# Patient Record
Sex: MALE | Race: WHITE | NOT HISPANIC OR LATINO | Employment: OTHER | URBAN - METROPOLITAN AREA
[De-identification: names, ages, dates, MRNs, and addresses within clinical notes are randomized per-mention and may not be internally consistent; named-entity substitution may affect disease eponyms.]

---

## 2022-07-18 ENCOUNTER — TELEPHONE (OUTPATIENT)
Dept: GASTROENTEROLOGY | Facility: CLINIC | Age: 52
End: 2022-07-18

## 2022-07-21 ENCOUNTER — TELEPHONE (OUTPATIENT)
Dept: GASTROENTEROLOGY | Facility: CLINIC | Age: 52
End: 2022-07-21

## 2022-07-21 NOTE — TELEPHONE ENCOUNTER
Patient LM on office VM requesting return call to schedule a [facility].  No other details provided.  I LM on patient VM to return my call after 8:00 am on Monday 7/25/22, as I will not be in the office on 7/22/22 to schedule his appt.

## 2023-01-10 ENCOUNTER — OFFICE VISIT (OUTPATIENT)
Dept: GASTROENTEROLOGY | Facility: CLINIC | Age: 53
End: 2023-01-10
Payer: OTHER GOVERNMENT

## 2023-01-10 VITALS
WEIGHT: 269 LBS | HEIGHT: 71 IN | SYSTOLIC BLOOD PRESSURE: 124 MMHG | BODY MASS INDEX: 37.66 KG/M2 | DIASTOLIC BLOOD PRESSURE: 78 MMHG

## 2023-01-10 DIAGNOSIS — R10.13 DYSPEPSIA: Primary | ICD-10-CM

## 2023-01-10 DIAGNOSIS — Z12.11 ENCOUNTER FOR SCREENING FOR MALIGNANT NEOPLASM OF COLON: ICD-10-CM

## 2023-01-10 PROCEDURE — 99204 OFFICE O/P NEW MOD 45 MIN: CPT | Performed by: INTERNAL MEDICINE

## 2023-01-10 RX ORDER — ALPRAZOLAM 0.5 MG/1
1 TABLET ORAL 2 TIMES DAILY
COMMUNITY
Start: 2022-12-22

## 2023-01-10 RX ORDER — OMEPRAZOLE 40 MG/1
1 CAPSULE, DELAYED RELEASE ORAL DAILY
COMMUNITY
Start: 2022-11-07 | End: 2023-01-10

## 2023-01-10 RX ORDER — DIPHENOXYLATE HYDROCHLORIDE AND ATROPINE SULFATE 2.5; .025 MG/1; MG/1
2 TABLET ORAL DAILY
COMMUNITY

## 2023-01-10 RX ORDER — OMEPRAZOLE 40 MG/1
40 CAPSULE, DELAYED RELEASE ORAL NIGHTLY
COMMUNITY

## 2023-01-10 RX ORDER — SUMATRIPTAN 100 MG/1
TABLET, FILM COATED ORAL
COMMUNITY
Start: 2022-10-04 | End: 2023-01-10

## 2023-01-10 RX ORDER — SUCRALFATE 1 G/1
1 TABLET ORAL NIGHTLY
COMMUNITY

## 2023-01-10 RX ORDER — CARVEDILOL 12.5 MG/1
TABLET ORAL
COMMUNITY
Start: 2022-07-25

## 2023-01-10 RX ORDER — PANTOPRAZOLE SODIUM 40 MG/1
40 TABLET, DELAYED RELEASE ORAL 2 TIMES DAILY
COMMUNITY

## 2023-01-10 RX ORDER — MELOXICAM 15 MG/1
1 TABLET ORAL DAILY
COMMUNITY
Start: 2022-11-07 | End: 2023-02-03 | Stop reason: HOSPADM

## 2023-01-10 RX ORDER — LOSARTAN POTASSIUM 50 MG/1
1 TABLET ORAL DAILY
COMMUNITY
Start: 2022-05-31 | End: 2023-06-01

## 2023-01-10 RX ORDER — CYCLOBENZAPRINE HCL 10 MG
1 TABLET ORAL 3 TIMES DAILY PRN
COMMUNITY
Start: 2023-01-09

## 2023-01-10 RX ORDER — ONDANSETRON 4 MG/1
4 TABLET, FILM COATED ORAL
COMMUNITY
Start: 2022-11-22

## 2023-01-10 RX ORDER — OLANZAPINE 2.5 MG/1
2.5 TABLET ORAL DAILY PRN
COMMUNITY
Start: 2022-08-02

## 2023-01-10 RX ORDER — PRAZOSIN HYDROCHLORIDE 2 MG/1
2 CAPSULE ORAL DAILY
COMMUNITY
Start: 2022-10-08

## 2023-01-10 RX ORDER — TADALAFIL 5 MG/1
1 TABLET ORAL DAILY
COMMUNITY
Start: 2022-10-09

## 2023-01-10 RX ORDER — ZOLPIDEM TARTRATE 10 MG/1
1 TABLET ORAL NIGHTLY PRN
COMMUNITY
Start: 2023-01-03

## 2023-01-10 NOTE — PROGRESS NOTES
PATIENT INFORMATION  Jorge Fung       - 1970    CHIEF COMPLAINT  Chief Complaint   Patient presents with   • Heartburn   • Abdominal Pain   • Vomiting       HISTORY OF PRESENT ILLNESS  Here from [facility]  For substernal burning and up to chest. Aggravated by PTSD which a couple of years ago had a \"breakdown\"     So his Nocturnal symptroms have increased to Daily symptoms and not necessarily worse with food but can bother him an hour to 90 min after a meal.     No vomiting nor dysphaiga      REVIEWED PERTINENT RESULTS/ LABS  No results found for: CASEREPORT, FINALDX  Lab Results   Component Value Date    HGB 16.1 2021    MCV 90.5 2021     2021    ALT 33 2020    AST 37 2020    HGBA1C 5.4 2020    TRIG 111 2020      No results found.    REVIEW OF SYSTEMS  Review of Systems   Constitutional: Negative.    Eyes: Negative.    Respiratory: Negative.    Cardiovascular: Negative.    Gastrointestinal: Positive for nausea and vomiting.        Reflux     Endocrine: Negative.    Genitourinary: Negative.    Musculoskeletal: Positive for arthralgias and back pain.   Skin: Negative.    Allergic/Immunologic: Positive for environmental allergies.   Neurological: Positive for headaches.   Hematological: Negative.    Psychiatric/Behavioral: Positive for agitation, sleep disturbance and suicidal ideas. The patient is nervous/anxious.          ACTIVE PROBLEMS  There are no problems to display for this patient.        PAST MEDICAL HISTORY  History reviewed. No pertinent past medical history.      SURGICAL HISTORY  History reviewed. No pertinent surgical history.      FAMILY HISTORY  History reviewed. No pertinent family history.      SOCIAL HISTORY  Social History     Occupational History   • Not on file   Tobacco Use   • Smoking status: Never   • Smokeless tobacco: Never   Vaping Use   • Vaping Use: Never used   Substance and Sexual Activity   • Alcohol use: Not Currently   •  Drug use: Never   • Sexual activity: Yes         CURRENT MEDICATIONS    Current Outpatient Medications:   •  ALPRAZolam (XANAX) 0.5 MG tablet, Take 1 tablet by mouth 2 (Two) Times a Day., Disp: , Rfl:   •  aspirin-sod bicarb-citric acid (DUNCAN-SELTZER) 325 MG effervescent tablet, Take 325 mg by mouth Every 6 (Six) Hours As Needed., Disp: , Rfl:   •  carvedilol (COREG) 12.5 MG tablet, , Disp: , Rfl:   •  cyclobenzaprine (FLEXERIL) 10 MG tablet, Take 1 tablet by mouth 3 (Three) Times a Day As Needed., Disp: , Rfl:   •  losartan (COZAAR) 50 MG tablet, Take 1 tablet by mouth Daily., Disp: , Rfl:   •  meloxicam (MOBIC) 15 MG tablet, Take 1 tablet by mouth Daily., Disp: , Rfl:   •  Mirabegron ER (Myrbetriq) 25 MG tablet sustained-release 24 hour 24 hr tablet, Take 25 mg by mouth., Disp: , Rfl:   •  multivitamin (THERAGRAN) tablet tablet, Take 2 tablets by mouth Daily., Disp: , Rfl:   •  OLANZapine (zyPREXA) 2.5 MG tablet, Take 2.5 mg by mouth Daily As Needed., Disp: , Rfl:   •  omeprazole (priLOSEC) 40 MG capsule, Take 40 mg by mouth Every Night., Disp: , Rfl:   •  ondansetron (ZOFRAN) 4 MG tablet, Take 4 mg by mouth., Disp: , Rfl:   •  pantoprazole (Protonix) 40 MG EC tablet, Take 40 mg by mouth 2 (Two) Times a Day., Disp: , Rfl:   •  prazosin (MINIPRESS) 2 MG capsule, Take 2 mg by mouth Daily., Disp: , Rfl:   •  sucralfate (CARAFATE) 1 g tablet, Take 1 g by mouth Every Night., Disp: , Rfl:   •  tadalafil (CIALIS) 5 MG tablet, Take 1 tablet by mouth Daily., Disp: , Rfl:   •  zolpidem (AMBIEN) 10 MG tablet, Take 1 tablet by mouth At Night As Needed., Disp: , Rfl:     ALLERGIES  Patient has no known allergies.    VITALS  Vitals:    01/10/23 1039   BP: 124/78   BP Location: Left arm   Patient Position: Sitting   Cuff Size: Adult   Weight: 122 kg (269 lb)   Height: 180.3 cm (71\")       PHYSICAL EXAM  Debilities/Disabilities Identified: None  Emotional Behavior: Appropriate  Wt Readings from Last 3 Encounters:   01/10/23 122  kg (269 lb)     Ht Readings from Last 1 Encounters:   01/10/23 180.3 cm (71\")     Body mass index is 37.52 kg/m².  Physical Exam  Constitutional:       Appearance: He is well-developed. He is not diaphoretic.   HENT:      Head: Normocephalic and atraumatic.   Eyes:      General: No scleral icterus.     Conjunctiva/sclera: Conjunctivae normal.      Pupils: Pupils are equal, round, and reactive to light.   Neck:      Thyroid: No thyromegaly.   Cardiovascular:      Rate and Rhythm: Normal rate and regular rhythm.      Heart sounds: Normal heart sounds. No murmur heard.    No gallop.   Pulmonary:      Effort: Pulmonary effort is normal.      Breath sounds: Normal breath sounds. No wheezing or rales.   Abdominal:      General: Bowel sounds are normal. There is no distension or abdominal bruit.      Palpations: Abdomen is soft. There is no shifting dullness, fluid wave or mass.      Tenderness: There is no abdominal tenderness. There is no guarding. Negative signs include Armstrong's sign.      Hernia: There is no hernia in the ventral area.   Musculoskeletal:         General: Normal range of motion.      Cervical back: Normal range of motion and neck supple.   Lymphadenopathy:      Cervical: No cervical adenopathy.   Skin:     General: Skin is warm and dry.      Findings: No erythema or rash.   Neurological:      Mental Status: He is alert and oriented to person, place, and time.   Psychiatric:         Mood and Affect: Mood normal.         Behavior: Behavior normal.         CLINICAL DATA REVIEWED   reviewed previous lab results and integrated with today's visit, reviewed notes from other physicians and/or last GI encounter, reviewed previous endoscopy results and available photos, reviewed surgical pathology results from previous biopsies    ASSESSMENT  Diagnoses and all orders for this visit:    Dyspepsia  -     Case Request; Standing  -     Case Request    Encounter for screening for malignant neoplasm of colon  -      Case Request; Standing  -     Case Request    Other orders  -     ALPRAZolam (XANAX) 0.5 MG tablet; Take 1 tablet by mouth 2 (Two) Times a Day.  -     carvedilol (COREG) 12.5 MG tablet  -     cyclobenzaprine (FLEXERIL) 10 MG tablet; Take 1 tablet by mouth 3 (Three) Times a Day As Needed.  -     losartan (COZAAR) 50 MG tablet; Take 1 tablet by mouth Daily.  -     meloxicam (MOBIC) 15 MG tablet; Take 1 tablet by mouth Daily.  -     multivitamin (THERAGRAN) tablet tablet; Take 2 tablets by mouth Daily.  -     OLANZapine (zyPREXA) 2.5 MG tablet; Take 2.5 mg by mouth Daily As Needed.  -     Discontinue: omeprazole (priLOSEC) 40 MG capsule; Take 1 capsule by mouth Daily.  -     ondansetron (ZOFRAN) 4 MG tablet; Take 4 mg by mouth.  -     prazosin (MINIPRESS) 2 MG capsule; Take 2 mg by mouth Daily.  -     Discontinue: SUMAtriptan (IMITREX) 100 MG tablet  -     tadalafil (CIALIS) 5 MG tablet; Take 1 tablet by mouth Daily.  -     zolpidem (AMBIEN) 10 MG tablet; Take 1 tablet by mouth At Night As Needed.  -     omeprazole (priLOSEC) 40 MG capsule; Take 40 mg by mouth Every Night.  -     pantoprazole (Protonix) 40 MG EC tablet; Take 40 mg by mouth 2 (Two) Times a Day.  -     sucralfate (CARAFATE) 1 g tablet; Take 1 g by mouth Every Night.  -     aspirin-sod bicarb-citric acid (DUNCAN-SELTZER) 325 MG effervescent tablet; Take 325 mg by mouth Every 6 (Six) Hours As Needed.  -     Mirabegron ER (Myrbetriq) 25 MG tablet sustained-release 24 hour 24 hr tablet; Take 25 mg by mouth.  -     Follow Anesthesia Guidelines / Protocol; Future  -     Obtain Informed Consent; Standing          PLAN    Return if symptoms worsen or fail to improve.    I have discussed the above plan with the patient.  They verbalize understanding and are in agreement with the plan.  They have been advised to contact the office for any questions, concerns, or changes related to their health.

## 2023-01-11 ENCOUNTER — TELEPHONE (OUTPATIENT)
Dept: GASTROENTEROLOGY | Facility: CLINIC | Age: 53
End: 2023-01-11
Payer: OTHER GOVERNMENT

## 2023-01-11 ENCOUNTER — PATIENT ROUNDING (BHMG ONLY) (OUTPATIENT)
Dept: GASTROENTEROLOGY | Facility: CLINIC | Age: 53
End: 2023-01-11
Payer: OTHER GOVERNMENT

## 2023-01-11 PROBLEM — Z12.11 ENCOUNTER FOR SCREENING FOR MALIGNANT NEOPLASM OF COLON: Status: ACTIVE | Noted: 2023-01-11

## 2023-01-11 PROBLEM — R10.13 DYSPEPSIA: Status: ACTIVE | Noted: 2023-01-11

## 2023-01-11 NOTE — PROGRESS NOTES
January 11, 2023    Hello, may I speak with Jorge Fung?    My name is Yelena Parker     I am  with [facility]  [address]  [facility] [address] [phone].    Before we get started may I verify your date of birth? 1970    I am calling to officially welcome you to our practice and ask about your recent visit. Is this a good time to talk? yes    Tell me about your visit with us. What things went well?  everything was great, staff was friendly, the doctor answered all my questions. Couldn't have been better.       We're always looking for ways to make our patients' experiences even better. Do you have recommendations on ways we may improve?  no    Overall were you satisfied with your first visit to our practice? yes       I appreciate you taking the time to speak with me today. Is there anything else I can do for you? no      Thank you, and have a great day.

## 2023-01-11 NOTE — TELEPHONE ENCOUNTER
----- Message from Guevara Rico MD sent at 1/10/2023 11:31 AM EST -----  EGD/Colon at [facility] either one  based on his schedule

## 2023-01-11 NOTE — TELEPHONE ENCOUNTER
Return call from patient.  Scheduled at [facility] 02/03/2023 at 11am - arrive 10am.  Will mail instructions.

## 2023-02-03 ENCOUNTER — ANESTHESIA (OUTPATIENT)
Dept: SURGERY | Facility: SURGERY CENTER | Age: 53
End: 2023-02-03
Payer: OTHER GOVERNMENT

## 2023-02-03 ENCOUNTER — ANESTHESIA EVENT (OUTPATIENT)
Dept: SURGERY | Facility: SURGERY CENTER | Age: 53
End: 2023-02-03
Payer: OTHER GOVERNMENT

## 2023-02-03 ENCOUNTER — HOSPITAL ENCOUNTER (OUTPATIENT)
Facility: SURGERY CENTER | Age: 53
Setting detail: HOSPITAL OUTPATIENT SURGERY
Discharge: HOME | End: 2023-02-03
Attending: INTERNAL MEDICINE | Admitting: INTERNAL MEDICINE
Payer: OTHER GOVERNMENT

## 2023-02-03 VITALS
WEIGHT: 255 LBS | HEART RATE: 89 BPM | DIASTOLIC BLOOD PRESSURE: 77 MMHG | SYSTOLIC BLOOD PRESSURE: 115 MMHG | OXYGEN SATURATION: 96 % | RESPIRATION RATE: 18 BRPM | BODY MASS INDEX: 35.7 KG/M2 | HEIGHT: 71 IN | TEMPERATURE: 98.4 F

## 2023-02-03 DIAGNOSIS — Z12.11 ENCOUNTER FOR SCREENING FOR MALIGNANT NEOPLASM OF COLON: ICD-10-CM

## 2023-02-03 DIAGNOSIS — R10.13 DYSPEPSIA: ICD-10-CM

## 2023-02-03 PROCEDURE — 88342 IMHCHEM/IMCYTCHM 1ST ANTB: CPT | Performed by: INTERNAL MEDICINE

## 2023-02-03 PROCEDURE — G0121 COLON CA SCRN NOT HI RSK IND: HCPCS | Performed by: INTERNAL MEDICINE

## 2023-02-03 PROCEDURE — 43239 EGD BIOPSY SINGLE/MULTIPLE: CPT | Performed by: INTERNAL MEDICINE

## 2023-02-03 PROCEDURE — 88305 TISSUE EXAM BY PATHOLOGIST: CPT | Performed by: INTERNAL MEDICINE

## 2023-02-03 PROCEDURE — 25010000002 PROPOFOL 10 MG/ML EMULSION: Performed by: ANESTHESIOLOGY

## 2023-02-03 PROCEDURE — 45378 DIAGNOSTIC COLONOSCOPY: CPT | Performed by: INTERNAL MEDICINE

## 2023-02-03 RX ORDER — SODIUM CHLORIDE, SODIUM LACTATE, POTASSIUM CHLORIDE, CALCIUM CHLORIDE 600; 310; 30; 20 MG/100ML; MG/100ML; MG/100ML; MG/100ML
20 INJECTION, SOLUTION INTRAVENOUS CONTINUOUS
Status: DISCONTINUED | OUTPATIENT
Start: 2023-02-03 | End: 2023-02-03 | Stop reason: HOSPADM

## 2023-02-03 RX ORDER — MAGNESIUM HYDROXIDE 1200 MG/15ML
LIQUID ORAL AS NEEDED
Status: DISCONTINUED | OUTPATIENT
Start: 2023-02-03 | End: 2023-02-03 | Stop reason: HOSPADM

## 2023-02-03 RX ORDER — LIDOCAINE HYDROCHLORIDE 20 MG/ML
INJECTION, SOLUTION INFILTRATION; PERINEURAL AS NEEDED
Status: DISCONTINUED | OUTPATIENT
Start: 2023-02-03 | End: 2023-02-03 | Stop reason: SURG

## 2023-02-03 RX ORDER — PROPOFOL 10 MG/ML
VIAL (ML) INTRAVENOUS AS NEEDED
Status: DISCONTINUED | OUTPATIENT
Start: 2023-02-03 | End: 2023-02-03 | Stop reason: SURG

## 2023-02-03 RX ADMIN — LIDOCAINE HYDROCHLORIDE 40 MG: 20 INJECTION, SOLUTION INFILTRATION; PERINEURAL at 11:23

## 2023-02-03 RX ADMIN — SODIUM CHLORIDE, SODIUM LACTATE, POTASSIUM CHLORIDE, AND CALCIUM CHLORIDE 20 ML/HR: .6; .31; .03; .02 INJECTION, SOLUTION INTRAVENOUS at 10:25

## 2023-02-03 RX ADMIN — GLYCOPYRROLATE 0.2 MG: 0.2 INJECTION, SOLUTION INTRAMUSCULAR; INTRAVITREAL at 11:23

## 2023-02-03 RX ADMIN — PROPOFOL 180 MCG/KG/MIN: 10 INJECTION, EMULSION INTRAVENOUS at 11:23

## 2023-02-03 RX ADMIN — PROPOFOL 200 MG: 10 INJECTION, EMULSION INTRAVENOUS at 11:23

## 2023-02-03 NOTE — BRIEF OP NOTE
ESOPHAGOGASTRODUODENOSCOPY, COLONOSCOPY  Progress Note    Jorge Fung  2/3/2023    Pre-op Diagnosis:   Dyspepsia [R10.13]  Encounter for screening for malignant neoplasm of colon [Z12.11]       Post-Op Diagnosis Codes:     * Dyspepsia [R10.13]     * Encounter for screening for malignant neoplasm of colon [Z12.11]     * Gastric ulcer [531]     * Reflux esophagitis [K21.00]    Procedure/CPT® Codes:        Procedure(s):  ESOPHAGOGASTRODUODENOSCOPY  COLONOSCOPY TO CECUM        Surgeon(s):  Guevara Rico MD    Anesthesia: Monitored Anesthesia Care    Staff:   Endo Technician: Rex Wolf, RN  Endo Nurse: Sandra Rand, NIURKA; Lori Bruce RN         Estimated Blood Loss: none    Urine Voided: * No values recorded between 2/3/2023 11:17 AM and 2/3/2023 11:43 AM *    Specimens:                Specimens     ID Source Type Tests Collected By Collected At Frozen?    A Gastric, Antrum Tissue · TISSUE PATHOLOGY EXAM   Guevara Rico MD 2/3/23 1128     Description: Gastric bx    Comment: Gastric bx    This specimen was not marked as sent.    B Esophagus, Distal Tissue · TISSUE PATHOLOGY EXAM   Guevara Rico MD 2/3/23 1128     Description: Distal esophagus bx    Comment: Distal esophagus bx    This specimen was not marked as sent.                Drains: * No LDAs found *    Findings: Normal Duodenum  Scattered Gastric Ulcers-Biopsy  Reflux Esophagitis-Biopsy    Colon to TI fair Prep  Normal Mucosa thru-out        Complications: None          Guevara Rico MD     Date: 2/3/2023  Time: 11:47 EST

## 2023-02-03 NOTE — ANESTHESIA PREPROCEDURE EVALUATION
Anesthesia Evaluation     Patient summary reviewed   no history of anesthetic complications:  NPO Solid Status: > 8 hours  NPO Liquid Status: > 2 hours           Airway   Mallampati: III  TM distance: >3 FB  Neck ROM: full  Possible difficult intubation and Large neck circumference  Dental - normal exam     Pulmonary - normal exam   (+) sleep apnea,   (-) COPD, asthma, not a smoker, lung cancer  Cardiovascular - normal exam  Exercise tolerance: good (4-7 METS)    Rhythm: regular  Rate: normal    (+) hypertension well controlled 2 medications or greater,   (-) valvular problems/murmurs, past MI, CAD, dysrhythmias, angina, CHF, cardiac stents, CABG, pericardial effusion      Neuro/Psych- negative ROS  (-) seizures, TIA, CVA  GI/Hepatic/Renal/Endo    (+) obesity,  GERD well controlled,      Musculoskeletal     Abdominal   (+) obese,     Abdomen: soft.   Substance History - negative use     OB/GYN          Other   arthritis,                      Anesthesia Plan    ASA 3     MAC     intravenous induction     Anesthetic plan, risks, benefits, and alternatives have been provided, discussed and informed consent has been obtained with: patient.        CODE STATUS:

## 2023-02-03 NOTE — ANESTHESIA POSTPROCEDURE EVALUATION
Patient: Jorge Fung    Procedure Summary     Date: 02/03/23 Room / Location: [facility] OR [facility] / [facility]    Anesthesia Start: 1117 Anesthesia Stop: 1149    Procedures:       ESOPHAGOGASTRODUODENOSCOPY (Esophagus)      COLONOSCOPY TO CECUM Diagnosis:       Dyspepsia      Encounter for screening for malignant neoplasm of colon      Gastric ulcer      Reflux esophagitis      (Dyspepsia [R10.13])      (Encounter for screening for malignant neoplasm of colon [Z12.11])    Surgeons: Guevara Rico MD Provider: Geronimo Regalado MD    Anesthesia Type: MAC ASA Status: 3          Anesthesia Type: MAC    Vitals  Vitals Value Taken Time   /76 02/03/23 1148   Temp 36.9 °C (98.4 °F) 02/03/23 1148   Pulse 101 02/03/23 1148   Resp 18 02/03/23 1148   SpO2 95 % 02/03/23 1148           Post Anesthesia Care and Evaluation    Patient location during evaluation: bedside  Patient participation: complete - patient participated  Level of consciousness: responsive to light touch, responsive to verbal stimuli and sleepy but conscious  Pain score: 0  Pain management: adequate    Airway patency: patent  Anesthetic complications: No anesthetic complications  PONV Status: none  Cardiovascular status: acceptable and hemodynamically stable  Respiratory status: acceptable  Hydration status: acceptable

## 2023-02-03 NOTE — INTERVAL H&P NOTE
"Vtial Signs  /83 (BP Location: Left arm, Patient Position: Lying)   Pulse 83   Temp 97.4 °F (36.3 °C) (Temporal)   Resp 16   Ht 180.3 cm (71\")   Wt 116 kg (255 lb)   SpO2 95%   BMI 35.57 kg/m²     H&P reviewed. The patient was examined and there are no changes to the H&P.        "

## 2023-02-07 LAB
LAB AP CASE REPORT: NORMAL
LAB AP CLINICAL INFORMATION: NORMAL
PATH REPORT.ADDENDUM SPEC: NORMAL
PATH REPORT.FINAL DX SPEC: NORMAL
PATH REPORT.GROSS SPEC: NORMAL

## 2023-04-13 ENCOUNTER — OFFICE VISIT (OUTPATIENT)
Dept: GASTROENTEROLOGY | Facility: CLINIC | Age: 53
End: 2023-04-13
Payer: OTHER GOVERNMENT

## 2023-04-13 VITALS
HEIGHT: 71 IN | BODY MASS INDEX: 35.14 KG/M2 | SYSTOLIC BLOOD PRESSURE: 128 MMHG | DIASTOLIC BLOOD PRESSURE: 84 MMHG | WEIGHT: 251 LBS

## 2023-04-13 DIAGNOSIS — K21.00 GASTROESOPHAGEAL REFLUX DISEASE WITH ESOPHAGITIS WITHOUT HEMORRHAGE: ICD-10-CM

## 2023-04-13 DIAGNOSIS — K25.9 GASTRIC ULCER WITHOUT HEMORRHAGE OR PERFORATION, UNSPECIFIED CHRONICITY: Primary | ICD-10-CM

## 2023-04-13 PROCEDURE — 99214 OFFICE O/P EST MOD 30 MIN: CPT

## 2023-04-13 RX ORDER — FAMOTIDINE 40 MG/1
40 TABLET, FILM COATED ORAL NIGHTLY
Qty: 90 TABLET | Refills: 3 | Status: SHIPPED | OUTPATIENT
Start: 2023-04-13

## 2023-04-13 RX ORDER — TOPIRAMATE 100 MG/1
150 TABLET, FILM COATED ORAL
COMMUNITY
Start: 2023-02-07

## 2023-04-13 RX ORDER — ALPRAZOLAM 0.5 MG/1
0.5 TABLET ORAL
COMMUNITY
Start: 2023-03-20 | End: 2023-04-13 | Stop reason: SDUPTHER

## 2023-04-13 RX ORDER — DIVALPROEX SODIUM 500 MG/1
500 TABLET, EXTENDED RELEASE ORAL DAILY
COMMUNITY
Start: 2023-02-07

## 2023-04-13 RX ORDER — OMEPRAZOLE 40 MG/1
1 CAPSULE, DELAYED RELEASE ORAL DAILY
COMMUNITY
Start: 2023-01-29 | End: 2023-04-13 | Stop reason: SDUPTHER

## 2023-04-13 NOTE — PROGRESS NOTES
PATIENT INFORMATION  Jorge Fung       - 1970    CHIEF COMPLAINT  Chief Complaint   Patient presents with   • Follow-up     EGD and Colonoscopy        HISTORY OF PRESENT ILLNESS  Here today for EGD and colon follow-up    2/3/2023 EGD and Colon for [facility] positive for erosive gastritis with visible ulcers, chronic reflux esophagitis, negative for HP, Barretts, avoid NSAIDS! Normal colon, so 10 yr recall. Has stopped mobic since, still taking ibuprofen.    At [facility] was having nocturnal and daily sx, often 1-1.5 hrs after meal. Substernal burning to chest. Only occurs with PTSD flare such as when awakening from sleep in night. 0-3 times a week, some worse than other. Several types of therapy concurrent for PTSD.    Now on nightly omeprazole, pantoprazole BID. Will stop omeprazole and add pepcid at night. Continue PRN QID.    Wegovy now, some constipation used miralax for relief, reviewed ok to take daily.      REVIEWED PERTINENT RESULTS/ LABS  Lab Results   Component Value Date    CASEREPORT  2023     Surgical Pathology Report                         Case: [id number]                                  Authorizing Provider:  Guevara Rico        Collected:           2023 11:28 AM                                 MD Monet                                                                   Ordering Location:     [facility]     Received:            2023 11:58 AM                                 [facility] [place] MAIN OR                                                     Pathologist:           Shannan Bailey MD                                                          Specimens:   1) - Gastric, Antrum, Gastric bx                                                                    2) - Esophagus, Distal, Distal esophagus bx                                                FINALDX  2023     1. Stomach, Antrum, Biopsy:    A. Gastric antral and oxyntic mucosa with chronic active  gastritis, lamina propria edema and focal surface                     erosion.   B. Negative for intestinal metaplasia.   C. H. pylori immunostain is pending with those results to follow as an addendum.    2. Esophagus, Distal, Biopsy:    A. Squamocolumnar mucosa with chronic inflammation and reactive epithelial changes.   B. Negative for intestinal metaplasia.    jab/jse        Lab Results   Component Value Date    HGB 16.1 05/13/2021    MCV 90.5 05/13/2021     05/13/2021    ALT 33 04/17/2020    AST 37 04/17/2020    HGBA1C 5.4 04/17/2020    TRIG 111 04/17/2020      No results found.    REVIEW OF SYSTEMS  Review of Systems   Constitutional: Positive for appetite change.   HENT: Negative.    Eyes: Negative.    Respiratory: Negative.    Cardiovascular: Negative.    Gastrointestinal: Positive for constipation.        GERD   Endocrine: Negative.    Genitourinary: Negative.    Musculoskeletal: Negative.    Skin: Negative.    Allergic/Immunologic: Negative.    Neurological: Negative.    Hematological: Negative.    Psychiatric/Behavioral: Positive for sleep disturbance.         ACTIVE PROBLEMS  Patient Active Problem List    Diagnosis    • Gastroesophageal reflux disease with esophagitis without hemorrhage [K21.00]    • Dyspepsia [R10.13]    • Encounter for screening for malignant neoplasm of colon [Z12.11]          PAST MEDICAL HISTORY  Past Medical History:   Diagnosis Date   • Arthritis    • GERD (gastroesophageal reflux disease)    • Hypertension    • Sleep apnea     doesn't wear CPAP         SURGICAL HISTORY  Past Surgical History:   Procedure Laterality Date   • BLEPHAROPLASTY  2017   • COLONOSCOPY N/A 2/3/2023    Procedure: COLONOSCOPY TO CECUM;  Surgeon: Guevara Rico MD;  Location[facility] OR;  Service: Gastroenterology;  Laterality: N/A;  normal   • CYST REMOVAL Left 2013    left knee   • ENDOSCOPY N/A 2/3/2023    Procedure: ESOPHAGOGASTRODUODENOSCOPY;  Surgeon: Guevara Rico  MD;  [place] [facility];  Service: Gastroenterology;  Laterality: N/A;  Espophagitis, gastric ulcer   • FACIAL PROCEDURE  1988   • RHINOPLASTY  1992   • TONSILLECTOMY           FAMILY HISTORY  History reviewed. No pertinent family history.      SOCIAL HISTORY  Social History     Occupational History   • Not on file   Tobacco Use   • Smoking status: Never   • Smokeless tobacco: Never   Vaping Use   • Vaping Use: Never used   Substance and Sexual Activity   • Alcohol use: Not Currently   • Drug use: Never   • Sexual activity: Yes         CURRENT MEDICATIONS    Current Outpatient Medications:   •  ALPRAZolam (XANAX) 0.5 MG tablet, Take 1 tablet by mouth 2 (Two) Times a Day., Disp: , Rfl:   •  aspirin-sod bicarb-citric acid (DUNCAN-SELTZER) 325 MG effervescent tablet, Take 325 mg by mouth Every 6 (Six) Hours As Needed., Disp: , Rfl:   •  carvedilol (COREG) 12.5 MG tablet, , Disp: , Rfl:   •  cyclobenzaprine (FLEXERIL) 10 MG tablet, Take 1 tablet by mouth 3 (Three) Times a Day As Needed., Disp: , Rfl:   •  divalproex (DEPAKOTE ER) 500 MG 24 hr tablet, Take 1 tablet by mouth Daily., Disp: , Rfl:   •  losartan (COZAAR) 50 MG tablet, Take 1 tablet by mouth Daily., Disp: , Rfl:   •  Mirabegron ER (MYRBETRIQ) 25 MG tablet sustained-release 24 hour 24 hr tablet, Take 1 tablet by mouth., Disp: , Rfl:   •  multivitamin (THERAGRAN) tablet tablet, Take 2 tablets by mouth Daily., Disp: , Rfl:   •  OLANZapine (zyPREXA) 2.5 MG tablet, Take 1 tablet by mouth Daily As Needed., Disp: , Rfl:   •  ondansetron (ZOFRAN) 4 MG tablet, Take 1 tablet by mouth., Disp: , Rfl:   •  pantoprazole (PROTONIX) 40 MG EC tablet, Take 1 tablet by mouth 2 (Two) Times a Day., Disp: , Rfl:   •  prazosin (MINIPRESS) 2 MG capsule, Take 1 capsule by mouth Daily., Disp: , Rfl:   •  sucralfate (CARAFATE) 1 g tablet, Take 1 tablet by mouth Every Night., Disp: , Rfl:   •  tadalafil (CIALIS) 5 MG tablet, Take 1 tablet by mouth Daily., Disp: , Rfl:   •  topiramate  "(TOPAMAX) 100 MG tablet, Take 1.5 tablets by mouth., Disp: , Rfl:   •  zolpidem (AMBIEN) 10 MG tablet, Take 1 tablet by mouth At Night As Needed., Disp: , Rfl:   •  famotidine (PEPCID) 40 MG tablet, Take 1 tablet by mouth Every Night., Disp: 90 tablet, Rfl: 3    ALLERGIES  Patient has no known allergies.    VITALS  Vitals:    04/13/23 0908   BP: 128/84   BP Location: Left arm   Patient Position: Sitting   Cuff Size: Adult   Weight: 114 kg (251 lb)   Height: 180.3 cm (70.98\")       PHYSICAL EXAM  Debilities/Disabilities Identified: None  Emotional Behavior: Appropriate  Wt Readings from Last 3 Encounters:   04/13/23 114 kg (251 lb)   02/03/23 116 kg (255 lb)   01/10/23 122 kg (269 lb)     Ht Readings from Last 1 Encounters:   04/13/23 180.3 cm (70.98\")     Body mass index is 35.02 kg/m².  Physical Exam  Constitutional:       General: He is not in acute distress.     Appearance: Normal appearance. He is not ill-appearing.   HENT:      Head: Normocephalic and atraumatic.      Mouth/Throat:      Mouth: Mucous membranes are moist.      Pharynx: No posterior oropharyngeal erythema.   Eyes:      General: No scleral icterus.  Cardiovascular:      Rate and Rhythm: Normal rate and regular rhythm.      Heart sounds: Normal heart sounds.   Pulmonary:      Effort: Pulmonary effort is normal.      Breath sounds: Normal breath sounds.   Abdominal:      General: Abdomen is flat. Bowel sounds are normal. There is no distension.      Palpations: Abdomen is soft. There is no mass.      Tenderness: There is no abdominal tenderness. There is no guarding or rebound. Negative signs include Armstrong's sign.      Hernia: No hernia is present.   Musculoskeletal:      Cervical back: Neck supple.   Skin:     General: Skin is warm.      Capillary Refill: Capillary refill takes less than 2 seconds.   Neurological:      General: No focal deficit present.      Mental Status: He is alert and oriented to person, place, and time.   Psychiatric:         " Mood and Affect: Mood normal.         Behavior: Behavior normal.         Thought Content: Thought content normal.         Judgment: Judgment normal.         CLINICAL DATA REVIEWED   reviewed previous lab results and integrated with today's visit, reviewed notes from other physicians and/or last GI encounter, reviewed previous endoscopy results and available photos, reviewed surgical pathology results from previous biopsies    ASSESSMENT  Diagnoses and all orders for this visit:    Gastric ulcer without hemorrhage or perforation, unspecified chronicity  -     famotidine (PEPCID) 40 MG tablet; Take 1 tablet by mouth Every Night.    Gastroesophageal reflux disease with esophagitis without hemorrhage    Other orders  -     Discontinue: ALPRAZolam (XANAX) 0.5 MG tablet; Take 1 tablet by mouth.  -     divalproex (DEPAKOTE ER) 500 MG 24 hr tablet; Take 1 tablet by mouth Daily.  -     Discontinue: omeprazole (priLOSEC) 40 MG capsule; Take 1 capsule by mouth Daily.  -     topiramate (TOPAMAX) 100 MG tablet; Take 1.5 tablets by mouth.          PLAN    Continue BID PPI, add pepcid at night  Miralax PRN    Return in about 1 year (around 4/13/2024).    I have discussed the above plan with the patient.  They verbalize understanding and are in agreement with the plan.  They have been advised to contact the office for any questions, concerns, or changes related to their health.

## 2023-05-02 ENCOUNTER — TELEPHONE (OUTPATIENT)
Dept: GASTROENTEROLOGY | Facility: CLINIC | Age: 53
End: 2023-05-02
Payer: OTHER GOVERNMENT

## 2023-05-02 NOTE — TELEPHONE ENCOUNTER
PT RETURNED YOUR CALL AND SAID THAT IF YOU WOULD CALL HIM TOMORROW AND TELL HIM WHERE TO COME  HIS LETTER , HE WOULD APPRECIATE IT

## 2023-05-02 NOTE — TELEPHONE ENCOUNTER
LM on patient's VM that I am typing his requested letter approving [facility] program per Dr. Rico.  Advised I will be contacting the pt tomorrow when this is completed and signed.

## 2023-05-04 ENCOUNTER — TELEPHONE (OUTPATIENT)
Dept: GASTROENTEROLOGY | Facility: CLINIC | Age: 53
End: 2023-05-04
Payer: OTHER GOVERNMENT

## 2023-09-28 ENCOUNTER — TELEPHONE (OUTPATIENT)
Dept: GASTROENTEROLOGY | Facility: CLINIC | Age: 53
End: 2023-09-28
Payer: OTHER GOVERNMENT

## 2023-09-28 NOTE — TELEPHONE ENCOUNTER
Patient called for copy of his EGD & colonoscopy from 02/2023.  Will be here to  around 2pm.  Will be at the .

## 2023-11-13 ENCOUNTER — TELEPHONE (OUTPATIENT)
Dept: GASTROENTEROLOGY | Facility: CLINIC | Age: 53
End: 2023-11-13
Payer: OTHER GOVERNMENT

## 2023-11-13 NOTE — TELEPHONE ENCOUNTER
Caller: Jorge Fung    Relationship: Self    Best call back number: [phone]     What form or medical record are you requesting: PHOTOS AND LAB REPORT    Who is requesting this form or medical record from you: SELF    How would you like to receive the form or medical records (pick-up, mail, fax): PICKUP AT OFFICE    If pick-up, provide patient with address and location details    Timeframe paperwork needed: ASAP    Additional notes: PT CALLED AND STATED HE [facility] AND WAS WONDERING IF HE COULD HAVE SOME COLOR PHOTOS OF HIS EGD PRINTED THAT WAS DONE IN FEB. PT WOULD ALSO LIKE A COPY OF THE LAB REPORT. PLEASE CALL BACK WHEN READY. PT WILL COME BY OFFICE TO .  CALL BACK ANYTIME OK TO [facility].

## 2023-11-16 NOTE — TELEPHONE ENCOUNTER
Patient came in yesterday to pick report.  He states needs to in color, no black and white.  Will try to get color Op report.    Fax release of Medical records for color copy.

## 2024-04-15 ENCOUNTER — OFFICE VISIT (OUTPATIENT)
Dept: GASTROENTEROLOGY | Facility: CLINIC | Age: 54
End: 2024-04-15
Payer: OTHER GOVERNMENT

## 2024-04-15 VITALS
SYSTOLIC BLOOD PRESSURE: 110 MMHG | HEIGHT: 71 IN | BODY MASS INDEX: 31.25 KG/M2 | DIASTOLIC BLOOD PRESSURE: 80 MMHG | WEIGHT: 223.2 LBS

## 2024-04-15 DIAGNOSIS — K59.09 CHRONIC CONSTIPATION: ICD-10-CM

## 2024-04-15 DIAGNOSIS — K21.00 GASTROESOPHAGEAL REFLUX DISEASE WITH ESOPHAGITIS WITHOUT HEMORRHAGE: Primary | ICD-10-CM

## 2024-04-15 PROCEDURE — 99214 OFFICE O/P EST MOD 30 MIN: CPT

## 2024-04-15 RX ORDER — OMEPRAZOLE 40 MG/1
40 CAPSULE, DELAYED RELEASE ORAL
Qty: 180 CAPSULE | Refills: 3 | Status: SHIPPED | OUTPATIENT
Start: 2024-04-15

## 2024-04-15 RX ORDER — ALPRAZOLAM 0.25 MG/1
0.25 TABLET ORAL 2 TIMES DAILY
COMMUNITY
Start: 2024-03-22

## 2024-04-15 NOTE — PROGRESS NOTES
PATIENT INFORMATION  Jorge Fung       - 1970    CHIEF COMPLAINT  Chief Complaint   Patient presents with    Heartburn       HISTORY OF PRESENT ILLNESS    Here today for GERD follow-up    GERD flare last night 1:30-2 AM with acid regurg, HB abdominal pain, epigastric distress, ran to bathroom, did not vomit. Took 2 sucralfate, sat  up watching TV, continued regurg, ice cream to coat esophagus, lasted all night. 2 more sucralfate this morning and feeling better other than tired. Pantoprazole BID AC, famotidine before bedtime. Within last several months, breakthrough a few times a week. Eating dinner 2.5-3 hrs before bed. Eating 1-2 meals a day.     At LOV was having nocturnal and daily sx, often 1-1.5 hrs after meal. Substernal burning to chest. Only occurs with PTSD flare such as when awakening from sleep in night. 0-3 times a week, some worse than other. Several types of therapy concurrent for PTSD.     Wegovy now, some constipation used miralax for relief, reviewed ok to take daily. Still on wegovy 2.4 which may contribute to symptoms. Bowels a little constipated, hard like gravel, moving daily. Taking senna daily, will stop and start miralax daily and call if no improvement.    2/3/2023 EGD and Colon for NCCP positive for erosive gastritis with visible ulcers, chronic reflux esophagitis, negative for HP, Barretts, avoid NSAIDS! Normal colon, so 10 yr recall. Has stopped mobic since, still taking ibuprofen.      Heartburn  He complains of abdominal pain, coughing and nausea.       REVIEWED PERTINENT RESULTS/ LABS  Lab Results   Component Value Date    CASEREPORT  2023     Surgical Pathology Report                         Case: [id number]                                  Authorizing Provider:  Guevara Rico        Collected:           2023 11:28 AM                                 MD Monet                                                                   Ordering Location:      [facility] SURGERY     Received:            02/03/2023 11:58 AM                                 [facility] MAIN OR                                                     Pathologist:           Shannan Bailey MD                                                          Specimens:   1) - Gastric, Antrum, Gastric bx                                                                    2) - Esophagus, Distal, Distal esophagus bx                                                FINALDX  02/03/2023     1. Stomach, Antrum, Biopsy:    A. Gastric antral and oxyntic mucosa with chronic active gastritis, lamina propria edema and focal surface                     erosion.   B. Negative for intestinal metaplasia.   C. H. pylori immunostain is pending with those results to follow as an addendum.    2. Esophagus, Distal, Biopsy:    A. Squamocolumnar mucosa with chronic inflammation and reactive epithelial changes.   B. Negative for intestinal metaplasia.    jab/jse        Lab Results   Component Value Date    HGB 16.1 05/13/2021    MCV 90.5 05/13/2021     05/13/2021    ALT 40 05/13/2021    AST 44 (H) 05/13/2021    HGBA1C 5.4 04/17/2020    TRIG 111 04/17/2020      No results found.    REVIEW OF SYSTEMS  Review of Systems   Constitutional: Negative.    HENT:  Positive for trouble swallowing.    Eyes: Negative.    Respiratory:  Positive for cough.    Cardiovascular: Negative.    Gastrointestinal:  Positive for abdominal pain, constipation and nausea.        GERD   Endocrine: Negative.    Genitourinary: Negative.    Musculoskeletal: Negative.    Skin: Negative.    Allergic/Immunologic: Negative.    Neurological: Negative.    Hematological: Negative.    Psychiatric/Behavioral: Negative.           ACTIVE PROBLEMS  Patient Active Problem List    Diagnosis     Chronic constipation [K59.09]     Gastroesophageal reflux disease with esophagitis without hemorrhage [K21.00]     Dyspepsia [R10.13]     Encounter for screening for  malignant neoplasm of colon [Z12.11]          PAST MEDICAL HISTORY  Past Medical History:   Diagnosis Date    Arthritis     GERD (gastroesophageal reflux disease)     Hernia 1970    Born wirh inguinal hernia    Hypertension     Sleep apnea     doesn't wear CPAP         SURGICAL HISTORY  Past Surgical History:   Procedure Laterality Date    BLEPHAROPLASTY  2017    COLONOSCOPY N/A 02/03/2023    Procedure: COLONOSCOPY TO CECUM;  Surgeon: Guevara Rico MD;  [place] [facility] [place]  Service: Gastroenterology;  Laterality: N/A;  normal    CYST REMOVAL Left 2013    left knee    ENDOSCOPY N/A 02/03/2023    Procedure: ESOPHAGOGASTRODUODENOSCOPY;  Surgeon: Guevara Rico MD;  [place] [facility] [place];  Service: Gastroenterology;  Laterality: N/A;  Espophagitis, gastric ulcer    FACIAL PROCEDURE  1988    HERNIA REPAIR  May 1970    RHINOPLASTY  1992    TONSILLECTOMY           FAMILY HISTORY  History reviewed. No pertinent family history.      SOCIAL HISTORY  Social History     Occupational History    Not on file   Tobacco Use    Smoking status: Never    Smokeless tobacco: Never   Vaping Use    Vaping status: Never Used   Substance and Sexual Activity    Alcohol use: Not Currently    Drug use: Never    Sexual activity: Yes         CURRENT MEDICATIONS    Current Outpatient Medications:     ALPRAZolam (XANAX) 0.25 MG tablet, Take 1 tablet by mouth 2 (Two) Times a Day., Disp: , Rfl:     aspirin-sod bicarb-citric acid (DUNCAN-SELTZER) 325 MG effervescent tablet, Take 325 mg by mouth Every 6 (Six) Hours As Needed., Disp: , Rfl:     carvedilol (COREG) 12.5 MG tablet, , Disp: , Rfl:     cyclobenzaprine (FLEXERIL) 10 MG tablet, Take 1 tablet by mouth 3 (Three) Times a Day As Needed., Disp: , Rfl:     divalproex (DEPAKOTE ER) 500 MG 24 hr tablet, Take 1 tablet by mouth Daily., Disp: , Rfl:     famotidine (PEPCID) 40 MG tablet, Take 1 tablet by mouth Every Night., Disp: 90 tablet, Rfl: 3    Mirabegron ER  "(MYRBETRIQ) 25 MG tablet sustained-release 24 hour 24 hr tablet, Take 1 tablet by mouth., Disp: , Rfl:     multivitamin (THERAGRAN) tablet tablet, Take 2 tablets by mouth Daily., Disp: , Rfl:     OLANZapine (zyPREXA) 2.5 MG tablet, Take 1 tablet by mouth Daily As Needed., Disp: , Rfl:     ondansetron (ZOFRAN) 4 MG tablet, Take 1 tablet by mouth., Disp: , Rfl:     pantoprazole (PROTONIX) 40 MG EC tablet, Take 1 tablet by mouth 2 (Two) Times a Day., Disp: , Rfl:     prazosin (MINIPRESS) 2 MG capsule, Take 1 capsule by mouth Daily., Disp: , Rfl:     sucralfate (CARAFATE) 1 g tablet, Take 1 tablet by mouth Every Night., Disp: , Rfl:     tadalafil (CIALIS) 5 MG tablet, Take 1 tablet by mouth Daily., Disp: , Rfl:     topiramate (TOPAMAX) 100 MG tablet, Take 1.5 tablets by mouth., Disp: , Rfl:     zolpidem (AMBIEN) 10 MG tablet, Take 1 tablet by mouth At Night As Needed., Disp: , Rfl:     losartan (COZAAR) 50 MG tablet, Take 1 tablet by mouth Daily., Disp: , Rfl:     ALLERGIES  Patient has no known allergies.    VITALS  Vitals:    04/15/24 1003   BP: 110/80   BP Location: Left arm   Patient Position: Sitting   Cuff Size: Large Adult   Weight: 101 kg (223 lb 3.2 oz)   Height: 180.3 cm (70.98\")       PHYSICAL EXAM  Debilities/Disabilities Identified: None  Emotional Behavior: Appropriate  Wt Readings from Last 3 Encounters:   04/15/24 101 kg (223 lb 3.2 oz)   04/13/23 114 kg (251 lb)   02/03/23 116 kg (255 lb)     Ht Readings from Last 1 Encounters:   04/15/24 180.3 cm (70.98\")     Body mass index is 31.14 kg/m².  Physical Exam  Constitutional:       General: He is not in acute distress.     Appearance: Normal appearance. He is not ill-appearing.   HENT:      Head: Normocephalic and atraumatic.      Mouth/Throat:      Mouth: Mucous membranes are moist.      Pharynx: No posterior oropharyngeal erythema.   Eyes:      General: No scleral icterus.  Cardiovascular:      Rate and Rhythm: Normal rate and regular rhythm.      Heart " sounds: Normal heart sounds.   Pulmonary:      Effort: Pulmonary effort is normal.      Breath sounds: Normal breath sounds.   Abdominal:      General: Abdomen is flat. Bowel sounds are normal. There is no distension.      Palpations: Abdomen is soft. There is no mass.      Tenderness: There is no abdominal tenderness. There is no guarding or rebound. Negative signs include Armstrong's sign.      Hernia: No hernia is present.   Musculoskeletal:      Cervical back: Neck supple.   Skin:     General: Skin is warm.      Capillary Refill: Capillary refill takes less than 2 seconds.   Neurological:      General: No focal deficit present.      Mental Status: He is alert and oriented to person, place, and time.   Psychiatric:         Mood and Affect: Mood normal.         Behavior: Behavior normal.         Thought Content: Thought content normal.         Judgment: Judgment normal.         CLINICAL DATA REVIEWED   reviewed previous lab results and integrated with today's visit, reviewed notes from other physicians and/or last GI encounter, reviewed previous endoscopy results and available photos, reviewed surgical pathology results from previous biopsies    ASSESSMENT  Diagnoses and all orders for this visit:    Gastroesophageal reflux disease with esophagitis without hemorrhage    Chronic constipation    Other orders  -     ALPRAZolam (XANAX) 0.25 MG tablet; Take 1 tablet by mouth 2 (Two) Times a Day.          PLAN    Reviewed GP diet  Will switch to omeprazole  Continue famotidine at night  Stop senna, add daily miralax, call if not enough improvement  Counseled to review pain options with patient and recommend avoiding NSAIDS    Return in about 6 months (around 10/15/2024).    I have discussed the above plan with the patient.  They verbalize understanding and are in agreement with the plan.  They have been advised to contact the office for any questions, concerns, or changes related to their health.

## 2024-08-02 ENCOUNTER — TELEPHONE (OUTPATIENT)
Dept: GASTROENTEROLOGY | Facility: CLINIC | Age: 54
End: 2024-08-02

## 2024-08-02 NOTE — TELEPHONE ENCOUNTER
Advised pt that letter would be ready for  on Monday afternoon.   I will call him as soon as I have Dr. Rico's signature. Pt voiced understanding and is satisfied with this plan.

## 2024-08-02 NOTE — TELEPHONE ENCOUNTER
Caller: Jorge Fung    Relationship to patient: Self    Best call back number:     Patient is needing: PATIENT STATES THAT HE HAS BEEN SPEAKING WITH SARAH IN REGARD TO [facility] THAT [facility] DR[facility]PED OFF. HE STATES THAT IT NEEDED TO BE EDITED AND THEN SIGNED BY DR. CALLOWAY. PLEASE CALL BACK TO UPDATE.

## 2024-09-10 ENCOUNTER — OFFICE VISIT (OUTPATIENT)
Dept: GASTROENTEROLOGY | Facility: CLINIC | Age: 54
End: 2024-09-10
Payer: OTHER GOVERNMENT

## 2024-09-10 VITALS
WEIGHT: 228 LBS | SYSTOLIC BLOOD PRESSURE: 118 MMHG | HEIGHT: 71 IN | DIASTOLIC BLOOD PRESSURE: 80 MMHG | BODY MASS INDEX: 31.92 KG/M2

## 2024-09-10 DIAGNOSIS — K21.00 GASTROESOPHAGEAL REFLUX DISEASE WITH ESOPHAGITIS WITHOUT HEMORRHAGE: Primary | ICD-10-CM

## 2024-09-10 DIAGNOSIS — K59.04 CHRONIC IDIOPATHIC CONSTIPATION: ICD-10-CM

## 2024-09-10 RX ORDER — IBUPROFEN 800 MG/1
800 TABLET, FILM COATED ORAL 3 TIMES DAILY
COMMUNITY
Start: 2024-04-22 | End: 2024-12-13

## 2024-09-10 RX ORDER — FUROSEMIDE 20 MG
20 TABLET ORAL DAILY
COMMUNITY
Start: 2024-08-22 | End: 2024-09-21

## 2024-09-10 RX ORDER — VONOPRAZAN FUMARATE 26.72 MG/1
20 TABLET ORAL DAILY
Qty: 30 TABLET | Refills: 1 | COMMUNITY
Start: 2024-09-10 | End: 2024-09-10 | Stop reason: SDUPTHER

## 2024-09-10 RX ORDER — VONOPRAZAN FUMARATE 26.72 MG/1
20 TABLET ORAL DAILY
Qty: 30 TABLET | Refills: 1 | Status: SHIPPED | OUTPATIENT
Start: 2024-09-10

## 2024-09-10 RX ORDER — RIMEGEPANT SULFATE 75 MG/75MG
TABLET, ORALLY DISINTEGRATING ORAL EVERY OTHER DAY
COMMUNITY
Start: 2024-06-01 | End: 2024-12-08

## 2024-09-10 RX ORDER — POLYETHYLENE GLYCOL 3350 17 G/17G
17 POWDER, FOR SOLUTION ORAL AS NEEDED
COMMUNITY
Start: 2024-06-11

## 2024-09-10 RX ORDER — SEMAGLUTIDE 2.4 MG/.75ML
INJECTION, SOLUTION SUBCUTANEOUS WEEKLY
COMMUNITY

## 2024-09-10 NOTE — PROGRESS NOTES
PATIENT INFORMATION  Jorge Fung       - 1970    CHIEF COMPLAINT  Chief Complaint   Patient presents with    Heartburn    Chronic idiopathic constipation       HISTORY OF PRESENT ILLNESS    Here today for GERD follow-up     GERD Per LOV: flare last night 1:30-2 AM with acid regurg, HB abdominal pain, epigastric distress, ran to bathroom, did not vomit. Took 2 sucralfate, sat  up watching TV, continued regurg, ice cream to coat esophagus, lasted all night. 2 more sucralfate this morning and feeling better other than tired. Pantoprazole BID AC, famotidine before bedtime. Within last several months, breakthrough a few times a week. Eating dinner 2.5-3 hrs before bed. Eating 1-2 meals a day. We switched to omeprazole BID AC and continued famotidine at night. Encouraged to avoid NSAIDs.    Has FAILED: Omeprazole, famotidine, pantoprazole, sucralfate and is still having persistent and severe symptoms even with a combination.    TODAY: Ended up on omeprazole and pantoprazole at same time and when ran out of pantoprazole started feeling bad again. HB at least once a week or every week and a half. Not taking sucralfate at night, sucralfate gives some relief as needed.      At LOV: Wegovy now, some constipation used miralax for relief, reviewed ok to take daily. Still on wegovy 2.4 which may contribute to symptoms. Bowels a little constipated, hard like gravel, moving daily. Taking senna daily, will stop and start miralax daily and call if no improvement. Reviewed GP diet. TODAY: Doing well on wegovy. Bowels are moving decent, miralax every other day, bowels moving daily but not often complete. Encouraged to take miralax daily.      2/3/2023 EGD and Colon for NCCP positive for erosive gastritis with visible ulcers, chronic reflux esophagitis, negative for HP, Barretts, avoid NSAIDS! Normal colon, so 10 yr recall. Has stopped mobic since, still taking ibuprofen.          REVIEWED PERTINENT RESULTS/ LABS  Lab  Results   Component Value Date    CASEREPORT  02/03/2023     Surgical Pathology Report                         Case: [id number]                                  Authorizing Provider:  Guevara Rico        Collected:           02/03/2023 11:28 AM                                 MD Monet                                                                   Ordering Location:     [facility] SURGERY     Received:            02/03/2023 11:58 AM                                 [facility] MAIN OR                                                     Pathologist:           Shannan Bailey MD                                                          Specimens:   1) - Gastric, Antrum, Gastric bx                                                                    2) - Esophagus, Distal, Distal esophagus bx                                                FINALDX  02/03/2023     1. Stomach, Antrum, Biopsy:    A. Gastric antral and oxyntic mucosa with chronic active gastritis, lamina propria edema and focal surface                     erosion.   B. Negative for intestinal metaplasia.   C. H. pylori immunostain is pending with those results to follow as an addendum.    2. Esophagus, Distal, Biopsy:    A. Squamocolumnar mucosa with chronic inflammation and reactive epithelial changes.   B. Negative for intestinal metaplasia.    jab/jse        Lab Results   Component Value Date    HGB 16.1 05/13/2021    MCV 90.5 05/13/2021     05/13/2021    ALT 40 05/13/2021    AST 44 (H) 05/13/2021    HGBA1C 5.4 04/17/2020    TRIG 111 04/17/2020      No results found.    REVIEW OF SYSTEMS  Review of Systems   Constitutional:  Negative for activity change, chills, fever and unexpected weight change.   HENT:  Positive for trouble swallowing. Negative for congestion.    Eyes:  Negative for visual disturbance.   Respiratory:  Negative for shortness of breath.    Cardiovascular:  Negative for chest pain and palpitations.    Gastrointestinal:  Positive for abdominal distention, abdominal pain, constipation and nausea. Negative for blood in stool.        Reflux   Endocrine: Negative for cold intolerance and heat intolerance.   Genitourinary:  Negative for hematuria.   Musculoskeletal:  Negative for gait problem.   Skin:  Negative for color change.   Allergic/Immunologic: Negative for immunocompromised state.   Neurological:  Negative for weakness and light-headedness.   Hematological:  Negative for adenopathy.   Psychiatric/Behavioral:  Negative for sleep disturbance. The patient is not nervous/anxious.          ACTIVE PROBLEMS  Patient Active Problem List    Diagnosis     Chronic constipation [K59.09]     Gastroesophageal reflux disease with esophagitis without hemorrhage [K21.00]     Dyspepsia [R10.13]     Encounter for screening for malignant neoplasm of colon [Z12.11]          PAST MEDICAL HISTORY  Past Medical History:   Diagnosis Date    Arthritis     GERD (gastroesophageal reflux disease)     Hernia 1970    Born wirh inguinal hernia    Hypertension     Sleep apnea     doesn't wear CPAP         SURGICAL HISTORY  Past Surgical History:   Procedure Laterality Date    BLEPHAROPLASTY  2017    COLONOSCOPY N/A 02/03/2023    Procedure: COLONOSCOPY TO CECUM;  Surgeon: Guevara Rico MD;  [place]: [facility] [place];  Service: Gastroenterology;  Laterality: N/A;  normal    CYST REMOVAL Left 2013    left knee    ENDOSCOPY N/A 02/03/2023    Procedure: ESOPHAGOGASTRODUODENOSCOPY;  Surgeon: Guevara Rico MD;  Location: [facility] [place] [facility] OR;  Service: Gastroenterology;  Laterality: N/A;  Espophagitis, gastric ulcer    FACIAL PROCEDURE  1988    HERNIA REPAIR  May 1970    RHINOPLASTY  1992    TONSILLECTOMY           FAMILY HISTORY  History reviewed. No pertinent family history.      SOCIAL HISTORY  Social History     Occupational History    Not on file   Tobacco Use    Smoking status: Never    Smokeless tobacco: Never   Vaping  Use    Vaping status: Never Used   Substance and Sexual Activity    Alcohol use: Not Currently    Drug use: Never    Sexual activity: Yes         CURRENT MEDICATIONS    Current Outpatient Medications:     ALPRAZolam (XANAX) 0.25 MG tablet, Take 1 tablet by mouth 2 (Two) Times a Day., Disp: , Rfl:     aspirin-sod bicarb-citric acid (DUNCAN-SELTZER) 325 MG effervescent tablet, Take 325 mg by mouth Every 6 (Six) Hours As Needed., Disp: , Rfl:     carvedilol (COREG) 12.5 MG tablet, Take 1 tablet by mouth 2 (Two) Times a Day With Meals., Disp: , Rfl:     cyclobenzaprine (FLEXERIL) 10 MG tablet, Take 1 tablet by mouth 3 (Three) Times a Day As Needed., Disp: , Rfl:     furosemide (LASIX) 20 MG tablet, Take 1 tablet by mouth Daily., Disp: , Rfl:     ibuprofen (ADVIL,MOTRIN) 800 MG tablet, Take 1 tablet by mouth 3 times a day., Disp: , Rfl:     Mirabegron ER (MYRBETRIQ) 25 MG tablet sustained-release 24 hour 24 hr tablet, Take 1 tablet by mouth., Disp: , Rfl:     OLANZapine (zyPREXA) 2.5 MG tablet, Take 1 tablet by mouth Daily As Needed., Disp: , Rfl:     ondansetron (ZOFRAN) 4 MG tablet, Take 1 tablet by mouth., Disp: , Rfl:     polyethylene glycol (MIRALAX) 17 GM/SCOOP powder, Take 17 g by mouth As Needed., Disp: , Rfl:     prazosin (MINIPRESS) 2 MG capsule, Take 1 capsule by mouth Daily., Disp: , Rfl:     Rimegepant Sulfate (Nurtec) 75 MG tablet dispersible tablet, Every Other Day., Disp: , Rfl:     Semaglutide-Weight Management (Wegovy) 2.4 MG/0.75ML solution auto-injector, Inject  under the skin into the appropriate area as directed 1 (One) Time Per Week., Disp: , Rfl:     sucralfate (CARAFATE) 1 g tablet, Take 1 tablet by mouth Every Night., Disp: , Rfl:     tadalafil (CIALIS) 5 MG tablet, Take 1 tablet by mouth Daily., Disp: , Rfl:     topiramate (TOPAMAX) 100 MG tablet, Take 1.5 tablets by mouth., Disp: , Rfl:     Vonoprazan Fumarate (Voquezna) 20 MG tablet, Take 1 tablet by mouth Daily., Disp: 30 tablet, Rfl: 1     "zolpidem (AMBIEN) 10 MG tablet, Take 1 tablet by mouth At Night As Needed., Disp: , Rfl:     losartan (COZAAR) 50 MG tablet, Take 1 tablet by mouth Daily., Disp: , Rfl:     ALLERGIES  Patient has no known allergies.    VITALS  Vitals:    09/10/24 1408   BP: 118/80   BP Location: Left arm   Patient Position: Sitting   Cuff Size: Large Adult   Weight: 103 kg (228 lb)   Height: 180.3 cm (70.98\")       PHYSICAL EXAM  Debilities/Disabilities Identified: None  Emotional Behavior: Appropriate  Wt Readings from Last 3 Encounters:   09/10/24 103 kg (228 lb)   04/15/24 101 kg (223 lb 3.2 oz)   04/13/23 114 kg (251 lb)     Ht Readings from Last 1 Encounters:   09/10/24 180.3 cm (70.98\")     Body mass index is 31.82 kg/m².  Physical Exam  Constitutional:       General: He is not in acute distress.     Appearance: Normal appearance. He is not ill-appearing.   HENT:      Head: Normocephalic and atraumatic.      Mouth/Throat:      Mouth: Mucous membranes are moist.      Pharynx: No posterior oropharyngeal erythema.   Eyes:      General: No scleral icterus.  Cardiovascular:      Rate and Rhythm: Normal rate and regular rhythm.      Heart sounds: Normal heart sounds.   Pulmonary:      Effort: Pulmonary effort is normal.      Breath sounds: Normal breath sounds.   Abdominal:      General: Abdomen is flat. Bowel sounds are normal. There is no distension.      Palpations: Abdomen is soft. There is no mass.      Tenderness: There is no abdominal tenderness. There is no guarding or rebound. Negative signs include Armstrong's sign.      Hernia: No hernia is present.   Musculoskeletal:      Cervical back: Neck supple.   Skin:     General: Skin is warm.      Capillary Refill: Capillary refill takes less than 2 seconds.   Neurological:      General: No focal deficit present.      Mental Status: He is alert and oriented to person, place, and time.   Psychiatric:         Mood and Affect: Mood normal.         Behavior: Behavior normal.         " Thought Content: Thought content normal.         Judgment: Judgment normal.         CLINICAL DATA REVIEWED   reviewed previous lab results and integrated with today's visit, reviewed notes from other physicians and/or last GI encounter, reviewed previous endoscopy results and available photos, reviewed surgical pathology results from previous biopsies    ASSESSMENT  Diagnoses and all orders for this visit:    Gastroesophageal reflux disease with esophagitis without hemorrhage    Chronic idiopathic constipation    Other orders  -     furosemide (LASIX) 20 MG tablet; Take 1 tablet by mouth Daily.  -     ibuprofen (ADVIL,MOTRIN) 800 MG tablet; Take 1 tablet by mouth 3 times a day.  -     polyethylene glycol (MIRALAX) 17 GM/SCOOP powder; Take 17 g by mouth As Needed.  -     Rimegepant Sulfate (Nurtec) 75 MG tablet dispersible tablet; Every Other Day.  -     Semaglutide-Weight Management (Wegovy) 2.4 MG/0.75ML solution auto-injector; Inject  under the skin into the appropriate area as directed 1 (One) Time Per Week.  -     Discontinue: Vonoprazan Fumarate (Voquezna) 20 MG tablet; Take 1 tablet by mouth Daily.  -     Vonoprazan Fumarate (Voquezna) 20 MG tablet; Take 1 tablet by mouth Daily.          PLAN    GERD: Start Voquezna, [facility], encouraged to avoid NSAIDs  CIC: Increase miralax to daily    Return in about 3 months (around 12/10/2024).    I have discussed the above plan with the patient.  They verbalize understanding and are in agreement with the plan.  They have been advised to contact the office for any questions, concerns, or changes related to their health.                       Bexarotene Pregnancy And Lactation Text: This medication is Pregnancy Category X and should not be given to women who are pregnant or may become pregnant. This medication should not be used if you are breast feeding.

## 2024-09-24 ENCOUNTER — TELEPHONE (OUTPATIENT)
Dept: GASTROENTEROLOGY | Facility: CLINIC | Age: 54
End: 2024-09-24

## 2024-09-24 NOTE — TELEPHONE ENCOUNTER
Caller: Jorge Fung    Relationship to patient: Self    Best call back number: [phone]     Patient is needing: PATIENT IS CALLING TO GIVE OUR CLINICAL TEAM THE [facility]X NUMBER TO [facility] Vonoprazan Fumarate (Voquezna) 20 MG tablet [[id number]]  RX.      FAX#:  [phone]

## 2024-09-26 DIAGNOSIS — K21.00 GASTROESOPHAGEAL REFLUX DISEASE WITH ESOPHAGITIS WITHOUT HEMORRHAGE: Primary | ICD-10-CM

## 2024-09-26 RX ORDER — VONOPRAZAN FUMARATE 26.72 MG/1
20 TABLET ORAL DAILY
Qty: 30 TABLET | Refills: 1 | Status: SHIPPED | OUTPATIENT
Start: 2024-09-26

## 2024-10-18 DIAGNOSIS — K21.00 GASTROESOPHAGEAL REFLUX DISEASE WITH ESOPHAGITIS WITHOUT HEMORRHAGE: ICD-10-CM

## 2024-10-18 RX ORDER — VONOPRAZAN FUMARATE 26.72 MG/1
20 TABLET ORAL DAILY
Qty: 15 TABLET | Refills: 0 | Status: CANCELLED | COMMUNITY
Start: 2024-10-18

## 2025-01-14 ENCOUNTER — OFFICE VISIT (OUTPATIENT)
Dept: GASTROENTEROLOGY | Facility: CLINIC | Age: 55
End: 2025-01-14
Payer: OTHER GOVERNMENT

## 2025-01-14 VITALS
HEIGHT: 71 IN | SYSTOLIC BLOOD PRESSURE: 130 MMHG | DIASTOLIC BLOOD PRESSURE: 100 MMHG | BODY MASS INDEX: 32.09 KG/M2 | WEIGHT: 229.2 LBS

## 2025-01-14 DIAGNOSIS — K21.00 GASTROESOPHAGEAL REFLUX DISEASE WITH ESOPHAGITIS WITHOUT HEMORRHAGE: Primary | ICD-10-CM

## 2025-01-14 PROCEDURE — 99213 OFFICE O/P EST LOW 20 MIN: CPT

## 2025-01-14 RX ORDER — RABEPRAZOLE SODIUM 20 MG/1
20 TABLET, DELAYED RELEASE ORAL 2 TIMES DAILY
COMMUNITY

## 2025-01-14 NOTE — PROGRESS NOTES
PATIENT INFORMATION  Jorge Fung       - 1970    CHIEF COMPLAINT  Chief Complaint   Patient presents with    Heartburn    Abdominal Pain    Difficulty Swallowing       HISTORY OF PRESENT ILLNESS    Here today for GERD follow-up     Was doing great on Voquezna and occasional breakthrough, now on rabeprazole BID and its doing better than pasts, but not as well as Voquezna. Required sucralfate.  [facility] all [facility] and [facility] and it was still denied. Reports anxiety and dreams from PTSD fires up symptoms.  Reviewed roll of NSAIDs in symptoms.  Symptoms are persistently recurrent, substernal pain can radiate to arm, dysphagia, regurg, heartburn. Dysphagia episodes are similar to spasm, no esophageal strictures, but disease is causing dysphagia due to spasms.  Reviewed optimal timing rabeprazole.    Ozempic, doing well and bowels are moving ok.      2/3/2023 EGD and Colon for [facility] positive for erosive gastritis with visible ulcers, chronic reflux esophagitis, negative for HP, Barretts, avoid NSAIDS! Normal colon, so 10 yr recall. Has stopped mobic since, still taking ibuprofen.          REVIEWED PERTINENT RESULTS/ LABS  Lab Results   Component Value Date    CASEREPORT  2023     Surgical Pathology Report                         Case: [id number]                                  Authorizing Provider:  Guevara Rico        Collected:           2023 11:28 AM                                 MD Monet                                                                   Ordering Location:     [facility]     Received:            2023 11:58 AM                                 [facility] MAIN OR                                                     Pathologist:           Shannan Bailey MD                                                          Specimens:   1) - Gastric, Antrum, Gastric bx                                                                     2) - Esophagus, Distal, Distal esophagus bx                                                FINALDX  02/03/2023     1. Stomach, Antrum, Biopsy:    A. Gastric antral and oxyntic mucosa with chronic active gastritis, lamina propria edema and focal surface                     erosion.   B. Negative for intestinal metaplasia.   C. H. pylori immunostain is pending with those results to follow as an addendum.    2. Esophagus, Distal, Biopsy:    A. Squamocolumnar mucosa with chronic inflammation and reactive epithelial changes.   B. Negative for intestinal metaplasia.    jab/jse        Lab Results   Component Value Date    HGB 16.1 05/13/2021    MCV 90.5 05/13/2021     05/13/2021    ALT 40 05/13/2021    AST 44 (H) 05/13/2021    HGBA1C 5.4 04/17/2020    TRIG 111 04/17/2020      No results found.    REVIEW OF SYSTEMS  Review of Systems      ACTIVE PROBLEMS  Patient Active Problem List    Diagnosis     Chronic constipation [K59.09]     Gastroesophageal reflux disease with esophagitis without hemorrhage [K21.00]     Dyspepsia [R10.13]     Encounter for screening for malignant neoplasm of colon [Z12.11]          PAST MEDICAL HISTORY  Past Medical History:   Diagnosis Date    Arthritis     GERD (gastroesophageal reflux disease)     Hernia 1970    Born wirh inguinal hernia    Hypertension     Sleep apnea     doesn't wear CPAP         SURGICAL HISTORY  Past Surgical History:   Procedure Laterality Date    BLEPHAROPLASTY  2017    COLONOSCOPY N/A 02/03/2023    Procedure: COLONOSCOPY TO CECUM;  Surgeon: Guevara Rico MD;  Location: [facility] [place] MAIN OR;  Service: Gastroenterology;  Laterality: N/A;  normal    CYST REMOVAL Left 2013    left knee    ENDOSCOPY N/A 02/03/2023    Procedure: ESOPHAGOGASTRODUODENOSCOPY;  Surgeon: Guevara Rico MD;  Location: [facility] [place] MAIN OR;  Service: Gastroenterology;  Laterality: N/A;  Espophagitis, gastric ulcer    FACIAL PROCEDURE  1988    HERNIA REPAIR  May 1970     RHINOPLASTY  1992    TONSILLECTOMY           FAMILY HISTORY  History reviewed. No pertinent family history.      SOCIAL HISTORY  Social History     Occupational History    Not on file   Tobacco Use    Smoking status: Never    Smokeless tobacco: Never   Vaping Use    Vaping status: Never Used   Substance and Sexual Activity    Alcohol use: Not Currently    Drug use: Never    Sexual activity: Yes         CURRENT MEDICATIONS    Current Outpatient Medications:     ALPRAZolam (XANAX) 0.25 MG tablet, Take 1 tablet by mouth 2 (Two) Times a Day., Disp: , Rfl:     aspirin-sod bicarb-citric acid (DUNCAN-SELTZER) 325 MG effervescent tablet, Take 325 mg by mouth Every 6 (Six) Hours As Needed., Disp: , Rfl:     carvedilol (COREG) 12.5 MG tablet, Take 1 tablet by mouth 2 (Two) Times a Day With Meals., Disp: , Rfl:     cyclobenzaprine (FLEXERIL) 10 MG tablet, Take 1 tablet by mouth 3 (Three) Times a Day As Needed., Disp: , Rfl:     furosemide (LASIX) 20 MG tablet, Take 1 tablet by mouth Daily., Disp: , Rfl:     losartan (COZAAR) 50 MG tablet, Take 1 tablet by mouth Daily., Disp: , Rfl:     Mirabegron ER (MYRBETRIQ) 25 MG tablet sustained-release 24 hour 24 hr tablet, Take 1 tablet by mouth., Disp: , Rfl:     OLANZapine (zyPREXA) 2.5 MG tablet, Take 1 tablet by mouth Daily As Needed., Disp: , Rfl:     ondansetron (ZOFRAN) 4 MG tablet, Take 1 tablet by mouth., Disp: , Rfl:     polyethylene glycol (MIRALAX) 17 GM/SCOOP powder, Take 17 g by mouth As Needed., Disp: , Rfl:     prazosin (MINIPRESS) 2 MG capsule, Take 1 capsule by mouth Daily., Disp: , Rfl:     RABEprazole (ACIPHEX) 20 MG EC tablet, Take 1 tablet by mouth 2 (Two) Times a Day., Disp: , Rfl:     Semaglutide-Weight Management (Wegovy) 2.4 MG/0.75ML solution auto-injector, Inject  under the skin into the appropriate area as directed 1 (One) Time Per Week., Disp: , Rfl:     sucralfate (CARAFATE) 1 g tablet, Take 1 tablet by mouth Every Night., Disp: , Rfl:     tadalafil (CIALIS)  "5 MG tablet, Take 1 tablet by mouth Daily., Disp: , Rfl:     topiramate (TOPAMAX) 100 MG tablet, Take 1.5 tablets by mouth., Disp: , Rfl:     zolpidem (AMBIEN) 10 MG tablet, Take 1 tablet by mouth At Night As Needed., Disp: , Rfl:     ALLERGIES  Patient has no known allergies.    VITALS  Vitals:    01/14/25 1526   BP: 130/100   BP Location: Left arm   Patient Position: Sitting   Cuff Size: Adult   Weight: 104 kg (229 lb 3.2 oz)   Height: 180.3 cm (70.98\")       PHYSICAL EXAM  Debilities/Disabilities Identified: None  Emotional Behavior: Appropriate  Wt Readings from Last 3 Encounters:   01/14/25 104 kg (229 lb 3.2 oz)   09/10/24 103 kg (228 lb)   04/15/24 101 kg (223 lb 3.2 oz)     Ht Readings from Last 1 Encounters:   01/14/25 180.3 cm (70.98\")     Body mass index is 31.98 kg/m².  Physical Exam  Constitutional:       General: He is not in acute distress.     Appearance: Normal appearance. He is not ill-appearing.   HENT:      Head: Normocephalic and atraumatic.      Mouth/Throat:      Mouth: Mucous membranes are moist.      Pharynx: No posterior oropharyngeal erythema.   Eyes:      General: No scleral icterus.  Cardiovascular:      Rate and Rhythm: Normal rate and regular rhythm.      Heart sounds: Normal heart sounds.   Pulmonary:      Effort: Pulmonary effort is normal.      Breath sounds: Normal breath sounds.   Abdominal:      General: Abdomen is flat. Bowel sounds are normal. There is no distension.      Palpations: Abdomen is soft. There is no mass.      Tenderness: There is no abdominal tenderness. There is no guarding or rebound. Negative signs include Armstrong's sign.      Hernia: No hernia is present.   Musculoskeletal:      Cervical back: Neck supple.   Skin:     General: Skin is warm.      Capillary Refill: Capillary refill takes less than 2 seconds.   Neurological:      General: No focal deficit present.      Mental Status: He is alert and oriented to person, place, and time.   Psychiatric:         Mood " and Affect: Mood normal.         Behavior: Behavior normal.         Thought Content: Thought content normal.         Judgment: Judgment normal.         CLINICAL DATA REVIEWED   reviewed previous lab results and integrated with today's visit, reviewed notes from other physicians and/or last GI encounter, reviewed previous endoscopy results and available photos, reviewed surgical pathology results from previous biopsies      ASSESSMENT  Diagnoses and all orders for this visit:    Gastroesophageal reflux disease with esophagitis without hemorrhage    Other orders  -     RABEprazole (ACIPHEX) 20 MG EC tablet; Take 1 tablet by mouth 2 (Two) Times a Day.          PLAN    GERD: Continue rabeprazole BID AC, PRN sucralfate    Return in about 6 months (around 7/14/2025).    I have discussed the above plan with the patient.  They verbalize understanding and are in agreement with the plan.  They have been advised to contact the office for any questions, concerns, or changes related to their health.

## (undated) DEVICE — JACKT LAB F/R KNIT CUFF/COLR XLG BLU

## (undated) DEVICE — FLEX ADVANTAGE 1500CC: Brand: FLEX ADVANTAGE

## (undated) DEVICE — GOWN ISOL W/THUMB UNIV BLU BX/15

## (undated) DEVICE — VIAL FORMLN CAP 10PCT 20ML

## (undated) DEVICE — SINGLE-USE BIOPSY FORCEPS: Brand: RADIAL JAW 4

## (undated) DEVICE — Device

## (undated) DEVICE — BITEBLOCK OMNI BLOC

## (undated) DEVICE — KT ORCA ORCAPOD DISP STRL